# Patient Record
Sex: FEMALE | Race: WHITE
[De-identification: names, ages, dates, MRNs, and addresses within clinical notes are randomized per-mention and may not be internally consistent; named-entity substitution may affect disease eponyms.]

---

## 2018-06-29 ENCOUNTER — HOSPITAL ENCOUNTER (EMERGENCY)
Dept: HOSPITAL 68 - ERH | Age: 53
End: 2018-06-29
Payer: COMMERCIAL

## 2018-06-29 VITALS — SYSTOLIC BLOOD PRESSURE: 150 MMHG | DIASTOLIC BLOOD PRESSURE: 110 MMHG

## 2018-06-29 DIAGNOSIS — R30.0: ICD-10-CM

## 2018-06-29 DIAGNOSIS — R35.0: ICD-10-CM

## 2018-06-29 DIAGNOSIS — R10.9: ICD-10-CM

## 2018-06-29 DIAGNOSIS — R11.2: Primary | ICD-10-CM

## 2018-06-29 LAB
ABSOLUTE GRANULOCYTE CT: 4.4 /CUMM (ref 1.4–6.5)
BASOPHILS # BLD: 0 /CUMM (ref 0–0.2)
BASOPHILS NFR BLD: 0.3 % (ref 0–2)
EOSINOPHIL # BLD: 0 /CUMM (ref 0–0.7)
EOSINOPHIL NFR BLD: 0.2 % (ref 0–5)
ERYTHROCYTE [DISTWIDTH] IN BLOOD BY AUTOMATED COUNT: 12.8 % (ref 11.5–14.5)
GRANULOCYTES NFR BLD: 75.3 % (ref 42.2–75.2)
HCT VFR BLD CALC: 40.4 % (ref 37–47)
LYMPHOCYTES # BLD: 1 /CUMM (ref 1.2–3.4)
MCH RBC QN AUTO: 30.7 PG (ref 27–31)
MCHC RBC AUTO-ENTMCNC: 35 G/DL (ref 33–37)
MCV RBC AUTO: 87.6 FL (ref 81–99)
MONOCYTES # BLD: 0.4 /CUMM (ref 0.1–0.6)
PLATELET # BLD: 230 /CUMM (ref 130–400)
PMV BLD AUTO: 7 FL (ref 7.4–10.4)
RED BLOOD CELL CT: 4.61 /CUMM (ref 4.2–5.4)
WBC # BLD AUTO: 5.9 /CUMM (ref 4.8–10.8)

## 2018-06-29 NOTE — ED GENERAL ADULT
History of Present Illness
 
General
Chief Complaint: General Adult
Stated Complaint: BIBA FEVER, LETHARGY, HX MS
Source: patient
Exam Limitations: no limitations
 
Vital Signs & Intake/Output
Vital Signs & Intake/Output
 Vital Signs
 
 
Date Time Temp Pulse Resp B/P B/P Pulse O2 O2 Flow FiO2
 
     Mean Ox Delivery Rate 
 
06/29 0952 97.8 75 20 184/101  95 Room Air  
 
 
 
Allergies
Coded Allergies:
No Known Allergies (05/31/17)
 
Reconcile Medications
Amantadine HCl (Amantadine) 100 MG CAPSULE   1 CAP PO DAILY MS  (Reported)
Baclofen 10 MG TABLET   1 TAB PO BID MUSCLE SPASMS  (Reported)
Baclofen 20 MG TABLET   1 TAB PO QPM MUSCLE SPASMS  (Reported)
Dalfampridine (Ampyra) (Unknown Strength) TAB.ER.12H   (Unknown Dose) UNKNOWN  (
Reported)
Dextroamphetamine/Amphetamine (Dextroamp-Amphetamin 10 MG Tab) 10 MG TABLET   1 
TAB PO BID STIMULANT  (Reported)
Ergocalciferol (Vitamin D2) (Vitamin D2) 50,000 UNIT CAPSULE   1 CAP PO QSUN 
SUPPLEMENT  (Reported)
Ibuprofen (Advil) 200 MG TABLET   2 PO AD PRN PAIN  (Reported)
Interferon Beta-1a/Albumin (Rebif 44 Mcg/0.5 Ml Syringe) 44 MCG/0.5 ML SYRINGE  
44 MCG INJ MONDAY WED FRIDAY MS  (Reported)
Lidocaine (Lidoderm) 5 % ADH..PATCH   1 PAT TOP DAILY PRN back pain
     may wear up to 12 hours
Naproxen (Naprosyn) 500 MG TABLET   1 TAB PO BID PRN back pain
Pregabalin (Lyrica) 75 MG CAPSULE   1 CAP PO BID NERVE PAIN  (Reported)
Tenofovir Alafenamide Fumarate (Vemlidy) 25 MG TABLET   1 TAB PO DAILY LIVER  (
Reported)
 
Triage Note:
PT BIBA FROM HOME WITH C/O 1 DAY HX OF INCREASED
 URINARY FREQUENCY, ABDOMINAL DISCOMFORT, AND
 VOMITING x1 TODAY. PT ALSO REPORTS SUBJECTIVE
 FEVERS, ALTHOUGH NONE ON ARRIVAL. PT STARTED NEW
 CHEMOTHERAPY REGIMEN ON WEDNESDAY. HYPERTENSIVE ON
 ARRIVAL; ALL OTHER VSS
Triage Nurses Notes Reviewed? yes
HPI:
Patient is a 54 y/o female w/ PMH of MS on monthly chemotherapy and steroid 
infusions presents several days after her latest dose with nausea and vomiting. 
She also complains of abdominal discomfort and dysuria and frequency.  Upon my 
initial encounter she is uncomfortable and actively nauseous.
 
Past History
 
Travel History
Traveled to [place] past 21 day No
 
Medical History
Any Pertinent Medical History? see below for history
Neurological: NONE
EENT: NONE
Cardiovascular: NONE
Respiratory: NONE
Gastrointestinal: NONE
Hepatic: NONE
Renal: NONE
Musculoskeletal: chronic back pain
Psychiatric: NONE
Endocrine: NONE
Blood Disorders: NONE
Cancer(s): NONE
GYN/Reproductive: NONE
 
Surgical History
Surgical History: non-contributory
 
Psychosocial History
What is your primary language Other
 
Family History
Hx Contributory? No
 
Review of Systems
 
Review of Systems
Constitutional:
Reports: see HPI, weakness. 
EENTM:
Reports: no symptoms. 
Respiratory:
Reports: no symptoms. 
Cardiovascular:
Reports: no symptoms. 
GI:
Reports: nausea, vomiting. 
Genitourinary:
Reports: dysuria, frequency. 
Musculoskeletal:
Reports: no symptoms. 
Skin:
Reports: no symptoms. 
Neurological/Psychological:
Reports: no symptoms. 
Hematologic/Endocrine:
Reports: no symptoms. 
Immunologic/Allergic:
Reports: no symptoms. 
All Other Systems: Reviewed and Negative
 
Physical Exam
 
Physical Exam
General Appearance: alert, awake, anxious, mild distress
Comments:
HEENT: Inspection of the head reveals a normocephalic cranium with no signs of 
trauma.
Ophtho: Extraocular muscles are intact and pupils are equal and reactive to 
light bilaterally with no afferent pupillary defect.  The sclera are noninjected
, and there is no obvious discharge.
Neck: The trachea is midline, there is no obvious asymmetry or mass over the 
thyroid, and there is no midline cervical spine tenderness
Respiratory: The lungs are clear and equal to auscultation bilaterally without 
wheezes, rales, or rhonchi.  The patient exhibits no signs of labored breathing.
Cardiac: Regular rhythm and non-tachycardic without appreciable murmurs on 
auscultation.  No obvious JVD.
GI: Acive nausea and vomiting.  Examination of the abdomen reveals diffuse 
abdominal pain without focality.  There is negative Adams's sign, negative 
McBurney's point tenderness, negative Milad sign, negative Grey-Bolton sign, 
and no signs of peritonitis whatsoever on percussion or deep palpation.  The 
skin is intact with no sign of trauma or infection.
: Deferred
Neuro: The patient is oriented to person, place, time, and situation, with no 
obvious focal motor deficits.  There were no sensory deficits, and the patient 
exhibit purposeful movement of all 4 extremities.  Cranial nerves II through XII
are intact, and gait is normal.
Behavioral: Anxious regarding symptoms.
Dermatologic: Dermatologic examination reveals no diffuse rashes or exanthems, 
no petechiae, no ecchymoses, and no other signs of erythema or infection.
 
Core Measures
ACS in differential dx? Yes
CVA/TIA Diagnosis: No
Sepsis Present: No
Sepsis Focused Exam Completed? No
 
Progress
Differential Diagnoses
I considered the following diagnoses in my evaluation of the patient: Allegedly 
abnormality, postchemotherapy nausea, acute coronary syndrome, intra-abdominal 
surgical pathology, multiple other possibilities.
 
Plan of Care:
 Orders
 
 
Procedure Date/time Status
 
URINALYSIS 06/29 1024 Complete
 
TROPONIN LEVEL 06/29 1024 Complete
 
LIPASE 06/29 1024 Complete
 
COMPREHENSIVE METABOLIC PANEL 06/29 1024 Complete
 
CBC WITHOUT DIFFERENTIAL 06/29 1024 Complete
 
EKG 06/29 1024 Active
 
 
 Laboratory Tests
 
 
06/29/18 1033:
Anion Gap 9, Estimated GFR > 60, BUN/Creatinine Ratio 32.5  H, Glucose 131  H, 
Calcium 9.3, Total Bilirubin 1.3, AST 27, ALT 47, Alkaline Phosphatase 62, 
Troponin I < 0.01, Total Protein 7.0, Albumin 4.3, Globulin 2.7, Albumin/
Globulin Ratio 1.6, Lipase 179, CBC w Diff NO MAN DIFF REQ, RBC 4.61, MCV 87.6, 
MCH 30.7, MCHC 35.0, RDW 12.8, MPV 7.0  L, Gran % 75.3  H, Lymphocytes % 17.4  L
, Monocytes % 6.8, Eosinophils % 0.2, Basophils % 0.3, Absolute Granulocytes 4.4
, Absolute Lymphocytes 1.0  L, Absolute Monocytes 0.4, Absolute Eosinophils 0, 
Absolute Basophils 0
 
06/29/18 1027:
Urine Color YEL, Urine Clarity CLEAR, Urine pH 7.5, Ur Specific Gravity 1.015, 
Urine Protein NEG, Urine Ketones NEG, Urine Nitrite NEG, Urine Bilirubin NEG, 
Urine Urobilinogen 0.2, Ur Leukocyte Esterase NEG, Ur Microscopic EXAM NOT 
REQUIRED, Urine Hemoglobin NEG, Urine Glucose NEG
 
Initial ED EKG: normal axis, none, normal intervals, normal p-waves, normal QRS 
complex, normal sinus rhythm, nonspecific ST T wave chg
Comments:
Patient presented several days after her most recent chemotherapy infusion with 
nausea and vomiting.  I was concerned about electrolyte abnormalities or other 
metabolic dyscrasias so we obtained laboratory studies which thankfully were 
unremarkable.  I provided ondansetron and 1 L of crystalloid resuscitation, and 
a reassessment the patient felt dramatically improved.  In fact, she was 
requesting discharge home.  Given her mild chest discomfort at arrival, I had 
also obtained a troponin and ECG, both of which were unremarkable.  I'm not 
concerned about acute coronary syndrome at this time.  I feel that outpatient 
management is reasonable, and she will follow-up with her PCP for reassessment 
this coming week.  Medical screening examination otherwise negative, patient 
stable at time of discharge.
 
Departure
 
Departure
Time of Disposition: 1218
Disposition: HOME OR SELF CARE
Condition: Stable
Clinical Impression
Primary Impression: Vomiting
Qualifiers:  Vomiting type: unspecified Vomiting Intractability: unspecified 
Nausea presence: with nausea Qualified Code: R11.2 - Nausea with vomiting, 
unspecified
Referrals:
SERGE Angela MD (PCP/Family)
 
Additional Instructions:
Please use the ondansetron (Zofran) to control your nausea at home and stay 
hydrated.  Call your primary physician to schedule an appointment for 
reassessment this coming week, and return to the emergency department in the 
meantime for any new or worsening symptoms.
Departure Forms:
Customer Survey
General Discharge Information
Prescriptions:
Current Visit Scripts
Ondansetron (Zofran Odt) 1 TAB SL Q6 PRN NAUSEA/VOMITING 
     #14 TAB 
 
 
 
Critical Care Note
 
Critical Care Note
Critical Care Time: non-applicable
no pedal edema